# Patient Record
Sex: MALE | Race: WHITE | URBAN - METROPOLITAN AREA
[De-identification: names, ages, dates, MRNs, and addresses within clinical notes are randomized per-mention and may not be internally consistent; named-entity substitution may affect disease eponyms.]

---

## 2022-06-16 ENCOUNTER — CLAIMS CREATED FROM THE CLAIM WINDOW (OUTPATIENT)
Dept: URBAN - METROPOLITAN AREA CLINIC 111 | Facility: CLINIC | Age: 22
End: 2022-06-16
Payer: COMMERCIAL

## 2022-06-16 ENCOUNTER — DASHBOARD ENCOUNTERS (OUTPATIENT)
Age: 22
End: 2022-06-16

## 2022-06-16 ENCOUNTER — WEB ENCOUNTER (OUTPATIENT)
Dept: URBAN - METROPOLITAN AREA CLINIC 111 | Facility: CLINIC | Age: 22
End: 2022-06-16

## 2022-06-16 VITALS
TEMPERATURE: 97.3 F | HEART RATE: 65 BPM | SYSTOLIC BLOOD PRESSURE: 129 MMHG | WEIGHT: 184.6 LBS | DIASTOLIC BLOOD PRESSURE: 82 MMHG | HEIGHT: 71 IN | BODY MASS INDEX: 25.84 KG/M2

## 2022-06-16 DIAGNOSIS — K62.89 RECTAL PAIN: ICD-10-CM

## 2022-06-16 DIAGNOSIS — K64.8 INTERNAL HEMORRHOID: ICD-10-CM

## 2022-06-16 DIAGNOSIS — K59.01 SLOW TRANSIT CONSTIPATION: ICD-10-CM

## 2022-06-16 PROBLEM — 35298007: Status: ACTIVE | Noted: 2022-06-16

## 2022-06-16 PROCEDURE — 99203 OFFICE O/P NEW LOW 30 MIN: CPT | Performed by: NURSE PRACTITIONER

## 2022-06-16 RX ORDER — NIFEDIPINE
PEA SIZE AMOUNT POWDER (GRAM) MISCELLANEOUS
Qty: 30 GRAMS | Refills: 1 | OUTPATIENT
Start: 2022-06-16 | End: 2022-08-15

## 2022-06-16 NOTE — HPI-TODAY'S VISIT:
20 yo male patient present with mom for rectal pain and constipation. Pt went to [facility] ER 6/14. Records rev'd, KUB non-obstructive gas pattern. Rectal exam revealed palpable tender internal hemorrhoids without masses, lesions or gross blood on glove. Pt has intermittent nausea but no vomiting. Denies fever, chills, abd pain or blood in stool. Last normal bowel movement was 6/11/22. He tried milk of magnesium with little relief. Rx given for Anusol suppository and stool softener at ER. Still having severe rectal pain and constipation.